# Patient Record
Sex: FEMALE
[De-identification: names, ages, dates, MRNs, and addresses within clinical notes are randomized per-mention and may not be internally consistent; named-entity substitution may affect disease eponyms.]

---

## 2021-01-10 ENCOUNTER — RESULT REVIEW (OUTPATIENT)
Age: 71
End: 2021-01-10

## 2021-10-28 PROBLEM — Z00.00 ENCOUNTER FOR PREVENTIVE HEALTH EXAMINATION: Status: ACTIVE | Noted: 2021-10-28

## 2021-10-30 ENCOUNTER — NON-APPOINTMENT (OUTPATIENT)
Age: 71
End: 2021-10-30

## 2021-10-30 ENCOUNTER — APPOINTMENT (OUTPATIENT)
Dept: ORTHOPEDIC SURGERY | Facility: CLINIC | Age: 71
End: 2021-10-30
Payer: MEDICARE

## 2021-10-30 VITALS
BODY MASS INDEX: 28.66 KG/M2 | DIASTOLIC BLOOD PRESSURE: 92 MMHG | SYSTOLIC BLOOD PRESSURE: 159 MMHG | HEART RATE: 60 BPM | HEIGHT: 60 IN | WEIGHT: 146 LBS

## 2021-10-30 DIAGNOSIS — M54.50 LOW BACK PAIN, UNSPECIFIED: ICD-10-CM

## 2021-10-30 PROCEDURE — 72100 X-RAY EXAM L-S SPINE 2/3 VWS: CPT

## 2021-10-30 PROCEDURE — 99203 OFFICE O/P NEW LOW 30 MIN: CPT

## 2021-10-30 RX ORDER — MELOXICAM 7.5 MG/1
7.5 TABLET ORAL
Qty: 60 | Refills: 2 | Status: ACTIVE | COMMUNITY
Start: 2021-10-30 | End: 1900-01-01

## 2021-10-30 RX ORDER — CYCLOBENZAPRINE HYDROCHLORIDE 5 MG/1
5 TABLET, FILM COATED ORAL 3 TIMES DAILY
Qty: 60 | Refills: 0 | Status: ACTIVE | COMMUNITY
Start: 2021-10-30 | End: 1900-01-01

## 2021-10-30 NOTE — PHYSICAL EXAM
[de-identified] : General Exam\par \par Well developed, well nourished\par No apparent distress\par Oriented to person, place, and time\par Mood: Normal\par Affect: Normal\par Balance and coordination: Normal\par Gait: Normal\par \par Lumbar Spine Exam\par \par Inspection: No lesions, no obvious deformity\par Palpation: No midline tenderness palpation, step-offs, or skin lesions. No tenderness to palpation of the sciatic notch bilaterally. Paraspinal tenderness bilaterally\par ROM: mildly restricted range of motion with respect to flexion, extension, lateral bending, and rotation. \par Strength: 5/5 IP/hip abductors/hip adductors/Q/H/EHL/TA/GS\par Hip ROM: No pain with passive internal/external rotation of the hips. \par Other neg straight leg raise on the left negative femoral stretch \par Sensation: Intact sensation to light touch bilateral lower extremities in L2-S1 distributions. \par Reflexes: 2+] patellar and reflexes. Downgoing\par Babinski. \par Pulses: 2+ DP and PT pulses [de-identified] : 2 views L spine obtained.  Preliminary report-  No acute fracture or spondylolisthesis noted. Multilevel degeneration lumbar spine.

## 2021-10-30 NOTE — HISTORY OF PRESENT ILLNESS
[de-identified] : 71-year-old female presents complaining of lower back pain 2 weeks. No acute injuries or trauma. She reports pain is localized to her mid section of her lower back. Sometimes feels in her buttocks. There is no radiation or radiculopathy down her legs.  She is taking Tylenol and Advil with some temporary relief. Pain is worse with lying down, bending over. Overall stable. Denies numbness tingling\par \par The patient's past medical history, past surgical history, medications, allergies, and social history were reviewed by me today with the patient and documented accordingly. In addition, the patient's family history, which is noncontributory to this visit, was also reviewed.\par

## 2021-10-30 NOTE — DISCUSSION/SUMMARY
[de-identified] : Options discussed. Recommend conservative treatment at this time. Provider with a prescription for physical therapy. Recommend moist, warm heat. Prescribed her a course of meloxicam, cyclobenzaprine. Side effects discussed. If no improvement consider evaluation from one of our spine specialists. All questions answered patient understands plan

## 2021-11-01 PROBLEM — M54.50 ACUTE MIDLINE LOW BACK PAIN WITHOUT SCIATICA: Status: ACTIVE | Noted: 2021-10-30

## 2022-01-22 ENCOUNTER — RESULT REVIEW (OUTPATIENT)
Age: 72
End: 2022-01-22

## 2024-11-05 ENCOUNTER — APPOINTMENT (OUTPATIENT)
Dept: ORTHOPEDIC SURGERY | Facility: CLINIC | Age: 74
End: 2024-11-05